# Patient Record
Sex: MALE | URBAN - METROPOLITAN AREA
[De-identification: names, ages, dates, MRNs, and addresses within clinical notes are randomized per-mention and may not be internally consistent; named-entity substitution may affect disease eponyms.]

---

## 2018-02-08 ENCOUNTER — PSORIASIS (OUTPATIENT)
Dept: URBAN - METROPOLITAN AREA CLINIC 32 | Facility: CLINIC | Age: 38
Setting detail: DERMATOLOGY
End: 2018-02-08

## 2018-02-08 PROBLEM — L71.8 OTHER ROSACEA: Status: RESOLVED | Noted: 2018-02-08

## 2018-02-08 PROCEDURE — 99202 OFFICE O/P NEW SF 15 MIN: CPT

## 2018-02-08 RX ORDER — METRONIDAZOLE 7.5 MG/G
1 APPLICATION CREAM TOPICAL QHS
Qty: 45 | Refills: 6
Start: 2018-02-08

## 2018-02-08 RX ORDER — DOXYCYCLINE HYCLATE 75 MG/1
1 TABLET TABLET, COATED ORAL DAILY
Qty: 30 | Refills: 2
Start: 2018-02-08

## 2018-02-08 RX ORDER — HYDROCORTISONE 25 MG/G
1 APPLICATION CREAM TOPICAL BID
Qty: 20 | Refills: 1
Start: 2018-02-08

## 2018-02-08 RX ORDER — SODIUM SULFACETAMIDE AND SULFUR 80; 40 MG/ML; MG/ML
1 ML LOTION TOPICAL DAILY
Qty: 30 | Refills: 6
Start: 2018-02-08

## 2022-03-18 PROBLEM — Z79.899 ENCOUNTER FOR MEDICATION MANAGEMENT: Status: ACTIVE | Noted: 2021-05-18

## 2022-03-18 PROBLEM — R29.3 POSTURAL IMBALANCE: Status: ACTIVE | Noted: 2021-05-18

## 2022-03-19 PROBLEM — F10.11 ALCOHOL ABUSE, IN REMISSION: Status: ACTIVE | Noted: 2021-05-18

## 2022-03-19 PROBLEM — R20.2 PARESTHESIA OF BOTH LOWER EXTREMITIES: Status: ACTIVE | Noted: 2021-05-18

## 2022-03-19 PROBLEM — R29.898 WEAKNESS OF BOTH LEGS: Status: ACTIVE | Noted: 2021-05-18

## 2022-03-20 PROBLEM — B20 NEUROPATHY DUE TO HIV (HCC): Status: ACTIVE | Noted: 2021-05-18

## 2022-03-20 PROBLEM — G63 NEUROPATHY DUE TO HIV (HCC): Status: ACTIVE | Noted: 2021-05-18

## 2022-05-27 ENCOUNTER — TELEPHONE (OUTPATIENT)
Dept: NEUROLOGY | Age: 42
End: 2022-05-27

## 2022-05-27 RX ORDER — GABAPENTIN 300 MG/1
300 CAPSULE ORAL 4 TIMES DAILY
Qty: 360 CAPSULE | Refills: 1 | Status: SHIPPED | OUTPATIENT
Start: 2022-05-27 | End: 2022-11-23

## 2022-05-27 NOTE — TELEPHONE ENCOUNTER
Prescription refill request received from [facility] for Gabapentin. Patient last seen 11/21/21. He is due for a yearly follow up. Refills sent to pharmacy but no further refills will be given.

## 2022-05-31 ENCOUNTER — TELEPHONE (OUTPATIENT)
Dept: NEUROLOGY | Age: 42
End: 2022-05-31

## 2022-12-09 NOTE — TELEPHONE ENCOUNTER
Berta Austin 84 left vm that pt needs refill for Gabapentin 300 mg. Last ov was 11/18/21 and the pt does not have a future appt. I reached out to the pt and was unable to leave a vm on [phone]. Pt needs FU appt.

## 2022-12-12 RX ORDER — GABAPENTIN 300 MG/1
CAPSULE ORAL
Qty: 360 CAPSULE | Refills: 1 | OUTPATIENT
Start: 2022-12-12

## 2022-12-13 ENCOUNTER — TELEPHONE (OUTPATIENT)
Dept: NEUROLOGY | Age: 42
End: 2022-12-13

## 2022-12-13 NOTE — TELEPHONE ENCOUNTER
Per call from patient's PCP, patient needs to book a fu appointment with Dr Raffy Ferrera. He has been unable to get through on the phone.   Verified number:

## 2022-12-21 NOTE — TELEPHONE ENCOUNTER
PCP called again and would like the office to try to reach out to patient again. They have called several times and can't get through. Thank you!

## 2022-12-27 ENCOUNTER — TELEPHONE (OUTPATIENT)
Dept: NEUROLOGY | Age: 42
End: 2022-12-27

## 2022-12-27 NOTE — TELEPHONE ENCOUNTER
Called 3 times over the last few days to schedule appointment with Dr. Mark Camacho. Have been unable to reach the patient to schedule. Had a spot on hold on 12/29/23 but due to not being able to reach the patient the spot has been released.

## 2025-03-11 ENCOUNTER — TELEPHONE (OUTPATIENT)
Dept: NEUROLOGY | Age: 45
End: 2025-03-11

## 2025-03-11 NOTE — TELEPHONE ENCOUNTER
Dr. Vi Ivan called and left a message that she wanted to talk to Dr. Cobb about this patient. ( [phone])

## 2025-03-18 ENCOUNTER — OFFICE VISIT (OUTPATIENT)
Age: 45
End: 2025-03-18
Payer: COMMERCIAL

## 2025-03-18 DIAGNOSIS — R26.89 BALANCE PROBLEM: Primary | ICD-10-CM

## 2025-03-18 PROCEDURE — 99203 OFFICE O/P NEW LOW 30 MIN: CPT | Performed by: ORTHOPAEDIC SURGERY

## 2025-03-18 RX ORDER — ESCITALOPRAM OXALATE 10 MG/1
10 TABLET ORAL DAILY
COMMUNITY
Start: 2025-01-02

## 2025-03-18 RX ORDER — MIRTAZAPINE 45 MG/1
45 TABLET, FILM COATED ORAL DAILY
COMMUNITY
Start: 2025-03-04

## 2025-03-18 RX ORDER — PROPRANOLOL HYDROCHLORIDE 60 MG/1
60 CAPSULE, EXTENDED RELEASE ORAL NIGHTLY
COMMUNITY
Start: 2025-03-03

## 2025-03-18 RX ORDER — DOLUTEGRAVIR SODIUM AND LAMIVUDINE 50; 300 MG/1; MG/1
1 TABLET, FILM COATED ORAL DAILY
COMMUNITY
Start: 2025-02-28

## 2025-03-18 RX ORDER — LORAZEPAM 1 MG/1
TABLET ORAL
COMMUNITY
Start: 2025-03-07

## 2025-03-18 RX ORDER — MIRTAZAPINE 30 MG/1
30 TABLET, FILM COATED ORAL NIGHTLY
COMMUNITY
Start: 2025-01-02

## 2025-03-18 NOTE — PROGRESS NOTES
Name: Will Toro  YOB: 1980  Gender: male  MRN: [id number]  Age: 45 y.o.      Chief Complaint: Low back pain and numbness in bilateral lower extremities    History of Present Illness:      This is a very pleasant 45 y.o. male who presents with a chronic history of low back pain and numbness in bilateral lower extremities.  He states been going on since 2019.  He states his numbness is worsened.  He also complains of some cramping he gets in bilateral legs.  He also feels that his coordination has been off.  He states that he has been diagnosed with neuropathy in the past.  He has had no treatment for this      Medications:       Current Outpatient Medications:     DOVATO  MG per tablet, Take 1 tablet by mouth daily, Disp: , Rfl:     escitalopram (LEXAPRO) 10 MG tablet, Take 1 tablet by mouth daily, Disp: , Rfl:     LORazepam (ATIVAN) 1 MG tablet, TAKE ONE TABLET BY MOUTH THREE TIMES DAILY AS NEEDED, Disp: , Rfl:     mirtazapine (REMERON) 30 MG tablet, Take 1 tablet by mouth nightly, Disp: , Rfl:     mirtazapine (REMERON) 45 MG tablet, Take 1 tablet by mouth daily, Disp: , Rfl:     propranolol (INDERAL LA) 60 MG extended release capsule, Take 1 capsule by mouth at bedtime, Disp: , Rfl:     gabapentin (NEURONTIN) 300 MG capsule, Take 1 capsule by mouth 4 times daily for 180 days., Disp: 360 capsule, Rfl: 1    Allergies:    No Known Allergies      Physical Exam:     This is a well developed well nourished male adult in no acute distress.     Mood and affect are appropriate.    Oriented to person, place, and time.    Respirations are unlabored and there is no evidence of cyanosis        There is minimal hip irritability with internal or external rotation bilaterally.      Pulses in the lower extremities are palpable and equal  Sensory testing:     L2 L3 L4 L5 S1 S2   Right 2 2 2 2 2 2   Left 2 2 2 2 2 2     0=absent; 1=altered; 2=normal; NT= not tested  Reflexes    Reflexes   Right Left

## 2025-05-05 ENCOUNTER — HOSPITAL ENCOUNTER (OUTPATIENT)
Dept: MRI IMAGING | Age: 45
Discharge: HOME | End: 2025-05-07
Attending: ORTHOPAEDIC SURGERY
Payer: COMMERCIAL

## 2025-05-05 DIAGNOSIS — R26.89 BALANCE PROBLEM: ICD-10-CM

## 2025-05-05 PROCEDURE — 70551 MRI BRAIN STEM W/O DYE: CPT

## 2025-05-06 ENCOUNTER — TELEPHONE (OUTPATIENT)
Dept: ORTHOPEDIC SURGERY | Age: 45
End: 2025-05-06

## 2025-05-06 NOTE — TELEPHONE ENCOUNTER
Scheduled pt 5/14/5 with Dr. Davies for MRI f/u, asked pt to call office to reschedule if date and time does not work for him.

## 2025-07-29 ENCOUNTER — OFFICE VISIT (OUTPATIENT)
Dept: NEUROLOGY | Age: 45
End: 2025-07-29
Payer: COMMERCIAL

## 2025-07-29 VITALS
BODY MASS INDEX: 18.46 KG/M2 | SYSTOLIC BLOOD PRESSURE: 111 MMHG | WEIGHT: 125 LBS | OXYGEN SATURATION: 97 % | DIASTOLIC BLOOD PRESSURE: 80 MMHG | HEART RATE: 85 BPM

## 2025-07-29 DIAGNOSIS — G60.9 IDIOPATHIC NEUROPATHY: Primary | ICD-10-CM

## 2025-07-29 DIAGNOSIS — Z87.898 HISTORY OF ALCOHOL USE: ICD-10-CM

## 2025-07-29 PROCEDURE — 99215 OFFICE O/P EST HI 40 MIN: CPT | Performed by: PSYCHIATRY & NEUROLOGY

## 2025-07-29 RX ORDER — VITAMIN B COMPLEX
1 CAPSULE ORAL DAILY
COMMUNITY

## 2025-07-29 RX ORDER — LORATADINE 10 MG/1
10 TABLET ORAL DAILY
COMMUNITY
Start: 2025-07-07

## 2025-07-29 NOTE — PROGRESS NOTES
[facility] NEUROLOGY NOTE    Patient: Will Toro  Physician: Zurdo Hargrove MD    CC:   Chief Complaint   Patient presents with    Follow-up     Pt reports he had a recent MRI with abnormal findings. Reports recent falls      PCP: Vi Ivan MD  Referred by: No ref. provider found     History of Present Illness:     Will Toro is a 45 y.o. 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 Worsening neuropathy with decreased ability to feel the gas pedal during driving. Some worsening issues with balance.  Recalls when his symptoms began while he was wearing dress shoes at his job with some slipping on the floor, occasional right leg myoclonic jerk, gradually noticed loss of hair on his shins bilaterally.  Neuropathic labs have been normal aside from CD4 counts in the 300s and 400 ranges, history of HIV, possible related neuropathy.  Alternatively, patient admits to a prolonged history of significant alcohol exposure when more than 6 years ago when [place] lived [place].  EMG/NCS from 2021 did not reveal significant findings for neuropathy, however we will repeat EMG soon.  He denies any significant neuropathic pain, although he is aware of how gabapentin can improve his symptoms.  He would be intolerant to Cymbalta from previous experience.           Review of Systems:   All systems were reviewed and relevant findings are addressed in the history and exam.   Neurological ROS per HPI.    Lab/Imaging Review:   I reviewed pertinent labs, images, and reports, explaining or showing relevant findings to the patient.    MRI Result (most recent):  MRI BRAIN WO CONTRAST 05/05/2025    Narrative  MRI BRAIN WO CONTRAST    Indication: 45 years Male Other abnormalities of gait and mobility    Comparison: No relevant study for comparison at the time of this dictation.    Technique: Multiplanar, multisequence MR images of the brain were obtained  without intravenous administration of

## 2025-08-05 ENCOUNTER — PROCEDURE VISIT (OUTPATIENT)
Dept: NEUROLOGY | Age: 45
End: 2025-08-05
Payer: COMMERCIAL

## 2025-08-05 DIAGNOSIS — G62.9 NEUROPATHY: Primary | ICD-10-CM

## 2025-08-05 DIAGNOSIS — R20.2 PARESTHESIA OF BOTH LOWER EXTREMITIES: ICD-10-CM

## 2025-08-05 DIAGNOSIS — R29.898 WEAKNESS OF BOTH LEGS: ICD-10-CM

## 2025-08-05 DIAGNOSIS — R29.3 POSTURAL IMBALANCE: ICD-10-CM

## 2025-08-05 PROCEDURE — 95885 MUSC TST DONE W/NERV TST LIM: CPT | Performed by: PSYCHIATRY & NEUROLOGY

## 2025-08-05 PROCEDURE — 95911 NRV CNDJ TEST 9-10 STUDIES: CPT | Performed by: PSYCHIATRY & NEUROLOGY
